# Patient Record
Sex: MALE | Race: WHITE | Employment: FULL TIME | URBAN - METROPOLITAN AREA
[De-identification: names, ages, dates, MRNs, and addresses within clinical notes are randomized per-mention and may not be internally consistent; named-entity substitution may affect disease eponyms.]

---

## 2016-02-03 LAB — PHQ9 SCORE: 19

## 2017-01-06 ENCOUNTER — TRANSFERRED RECORDS (OUTPATIENT)
Dept: HEALTH INFORMATION MANAGEMENT | Facility: CLINIC | Age: 32
End: 2017-01-06

## 2017-03-10 ENCOUNTER — TRANSFERRED RECORDS (OUTPATIENT)
Dept: HEALTH INFORMATION MANAGEMENT | Facility: CLINIC | Age: 32
End: 2017-03-10

## 2017-03-24 ENCOUNTER — TRANSFERRED RECORDS (OUTPATIENT)
Dept: HEALTH INFORMATION MANAGEMENT | Facility: CLINIC | Age: 32
End: 2017-03-24

## 2017-03-24 LAB — PHQ9 SCORE: 18

## 2017-09-14 ENCOUNTER — TELEPHONE (OUTPATIENT)
Dept: PEDIATRICS | Facility: CLINIC | Age: 32
End: 2017-09-14

## 2017-09-14 DIAGNOSIS — F41.9 ANXIETY DISORDER: ICD-10-CM

## 2017-09-14 RX ORDER — GABAPENTIN 100 MG/1
100 CAPSULE ORAL 4 TIMES DAILY
Qty: 90 CAPSULE | Refills: 2 | Status: CANCELLED | OUTPATIENT
Start: 2017-09-14

## 2017-09-14 RX ORDER — PROPRANOLOL HYDROCHLORIDE 10 MG/1
TABLET ORAL
Qty: 90 TABLET | Refills: 0 | Status: CANCELLED | OUTPATIENT
Start: 2017-09-14

## 2017-09-14 NOTE — TELEPHONE ENCOUNTER
Reason for Call: Patient stated he dropped his psychiatrist, patient is asking if he can receive a new Referral, and refill until he can get into the new appointment, Refills: Gabapentin, Propranolol, 10 mg, 3 x per day  Ph[facility]: [facility]    Routing refill request to provider for review/approval because:  Drug not on the [facility] refill protocol:  gabapentin  Drug not active on patient's medication list:  propranolol  Patient needs to be seen because it has been more than 1 year since last office visit.  Due for updated PHQ9 and JENNIFER.    Last office visit:  4/28/2016    PHQ-9 SCORE 4/2/2015 7/30/2015 4/25/2016   Total Score 16 18 -   Total Score - - 3     JENNIFER-7 SCORE 4/2/2015 7/30/2015 4/25/2016   Total Score 19 20 -   Total Score - - 0           Grisel Veloz RN,   [facility]

## 2017-09-14 NOTE — TELEPHONE ENCOUNTER
Gave patient the message below from Rissa Martinez.  He states it is his fault for letting it lapse as far as he did.  After he stopped seeing the psychiatrist he used up all of his refills.  He did ask for a new refill until being seen but it had been to long since seeing psychiatrist they wouldn't fill medication.  Informed patient we are booking out a couple of weeks for appts.  He would need an appt sooner.  Gave him the [phone] to schedule an appt at any [facility] location.  It is a central scheduling phone number.  He will try to get an appt and then get the referral at that time also.  He will call his insurance company to find out who he can see.    Grisel Veloz RN,   [facility]

## 2017-09-14 NOTE — TELEPHONE ENCOUNTER
Can not do this as been over year since seen by me   Would see if his present psychiatrist will fill until he has actual appointment with his new psychiatrist as it takes at least a month or more to get into a new psychiatrist   He may need to call his insurance d see where he can go

## 2017-09-14 NOTE — TELEPHONE ENCOUNTER
[facility] Call Center    Phone Message    Name of Caller: Eugene    Phone Number: Home number on file [phone] (home)    Best time to return call: any    May a detailed message be left on voicemail: yes    Relation to patient: Self    Reason for Call: Patient stated he dropped his psychiatrist, patient is asking if he can receive a new Referral, and refill until he can get into the new appointment, Refills: Gabapentin, Propranolol, 10 mg, 3 x per day  Pharm: [facility]    Action Taken: Message routed to:  Primary Care p [phone]

## 2020-02-16 ENCOUNTER — HEALTH MAINTENANCE LETTER (OUTPATIENT)
Age: 35
End: 2020-02-16

## 2020-11-22 ENCOUNTER — HEALTH MAINTENANCE LETTER (OUTPATIENT)
Age: 35
End: 2020-11-22

## 2021-04-10 ENCOUNTER — HEALTH MAINTENANCE LETTER (OUTPATIENT)
Age: 36
End: 2021-04-10

## 2021-09-19 ENCOUNTER — HEALTH MAINTENANCE LETTER (OUTPATIENT)
Age: 36
End: 2021-09-19

## 2022-05-01 ENCOUNTER — HEALTH MAINTENANCE LETTER (OUTPATIENT)
Age: 37
End: 2022-05-01

## 2022-11-21 ENCOUNTER — HEALTH MAINTENANCE LETTER (OUTPATIENT)
Age: 37
End: 2022-11-21

## 2023-06-02 ENCOUNTER — HEALTH MAINTENANCE LETTER (OUTPATIENT)
Age: 38
End: 2023-06-02